# Patient Record
Sex: FEMALE | Race: BLACK OR AFRICAN AMERICAN | NOT HISPANIC OR LATINO | Employment: FULL TIME | URBAN - METROPOLITAN AREA
[De-identification: names, ages, dates, MRNs, and addresses within clinical notes are randomized per-mention and may not be internally consistent; named-entity substitution may affect disease eponyms.]

---

## 2023-08-21 ENCOUNTER — LAB VISIT (OUTPATIENT)
Dept: LAB | Facility: HOSPITAL | Age: 18
End: 2023-08-21
Attending: STUDENT IN AN ORGANIZED HEALTH CARE EDUCATION/TRAINING PROGRAM
Payer: MEDICAID

## 2023-08-21 ENCOUNTER — HOSPITAL ENCOUNTER (OUTPATIENT)
Dept: RADIOLOGY | Facility: HOSPITAL | Age: 18
Discharge: HOME | End: 2023-08-21
Attending: STUDENT IN AN ORGANIZED HEALTH CARE EDUCATION/TRAINING PROGRAM
Payer: MEDICAID

## 2023-08-21 ENCOUNTER — OFFICE VISIT (OUTPATIENT)
Dept: OBSTETRICS AND GYNECOLOGY | Facility: CLINIC | Age: 18
End: 2023-08-21
Payer: MEDICAID

## 2023-08-21 VITALS
BODY MASS INDEX: 42.83 KG/M2 | HEART RATE: 92 BPM | HEIGHT: 65 IN | WEIGHT: 257.06 LBS | SYSTOLIC BLOOD PRESSURE: 102 MMHG | DIASTOLIC BLOOD PRESSURE: 75 MMHG

## 2023-08-21 DIAGNOSIS — N92.6 IRREGULAR PERIODS: Primary | ICD-10-CM

## 2023-08-21 DIAGNOSIS — N92.6 IRREGULAR PERIODS: ICD-10-CM

## 2023-08-21 LAB
DHEA-S SERPL-MCNC: 304.5 UG/DL (ref 134.2–407.4)
ESTIMATED AVG GLUCOSE: 111 MG/DL (ref 68–131)
HBA1C MFR BLD: 5.5 % (ref 4–5.6)
PROLACTIN SERPL IA-MCNC: 13 NG/ML (ref 5.2–26.5)
TESTOST SERPL-MCNC: 57 NG/DL (ref 5–73)
TSH SERPL DL<=0.005 MIU/L-ACNC: 1.94 UIU/ML (ref 0.4–4)

## 2023-08-21 PROCEDURE — 3074F SYST BP LT 130 MM HG: CPT | Mod: CPTII,,, | Performed by: STUDENT IN AN ORGANIZED HEALTH CARE EDUCATION/TRAINING PROGRAM

## 2023-08-21 PROCEDURE — 84270 ASSAY OF SEX HORMONE GLOBUL: CPT | Performed by: STUDENT IN AN ORGANIZED HEALTH CARE EDUCATION/TRAINING PROGRAM

## 2023-08-21 PROCEDURE — 3008F PR BODY MASS INDEX (BMI) DOCUMENTED: ICD-10-PCS | Mod: CPTII,,, | Performed by: STUDENT IN AN ORGANIZED HEALTH CARE EDUCATION/TRAINING PROGRAM

## 2023-08-21 PROCEDURE — 99204 OFFICE O/P NEW MOD 45 MIN: CPT | Mod: S$PBB,,, | Performed by: STUDENT IN AN ORGANIZED HEALTH CARE EDUCATION/TRAINING PROGRAM

## 2023-08-21 PROCEDURE — 82627 DEHYDROEPIANDROSTERONE: CPT | Performed by: STUDENT IN AN ORGANIZED HEALTH CARE EDUCATION/TRAINING PROGRAM

## 2023-08-21 PROCEDURE — 3008F BODY MASS INDEX DOCD: CPT | Mod: CPTII,,, | Performed by: STUDENT IN AN ORGANIZED HEALTH CARE EDUCATION/TRAINING PROGRAM

## 2023-08-21 PROCEDURE — 84403 ASSAY OF TOTAL TESTOSTERONE: CPT | Performed by: STUDENT IN AN ORGANIZED HEALTH CARE EDUCATION/TRAINING PROGRAM

## 2023-08-21 PROCEDURE — 99204 PR OFFICE/OUTPT VISIT, NEW, LEVL IV, 45-59 MIN: ICD-10-PCS | Mod: S$PBB,,, | Performed by: STUDENT IN AN ORGANIZED HEALTH CARE EDUCATION/TRAINING PROGRAM

## 2023-08-21 PROCEDURE — 99999 PR PBB SHADOW E&M-NEW PATIENT-LVL III: ICD-10-PCS | Mod: PBBFAC,,, | Performed by: STUDENT IN AN ORGANIZED HEALTH CARE EDUCATION/TRAINING PROGRAM

## 2023-08-21 PROCEDURE — 1159F PR MEDICATION LIST DOCUMENTED IN MEDICAL RECORD: ICD-10-PCS | Mod: CPTII,,, | Performed by: STUDENT IN AN ORGANIZED HEALTH CARE EDUCATION/TRAINING PROGRAM

## 2023-08-21 PROCEDURE — 99999 PR PBB SHADOW E&M-NEW PATIENT-LVL III: CPT | Mod: PBBFAC,,, | Performed by: STUDENT IN AN ORGANIZED HEALTH CARE EDUCATION/TRAINING PROGRAM

## 2023-08-21 PROCEDURE — 76856 US PELVIS COMP WITH TRANSVAG NON-OB (XPD): ICD-10-PCS | Mod: 26,,, | Performed by: RADIOLOGY

## 2023-08-21 PROCEDURE — 76830 TRANSVAGINAL US NON-OB: CPT | Mod: 26,,, | Performed by: RADIOLOGY

## 2023-08-21 PROCEDURE — 1159F MED LIST DOCD IN RCRD: CPT | Mod: CPTII,,, | Performed by: STUDENT IN AN ORGANIZED HEALTH CARE EDUCATION/TRAINING PROGRAM

## 2023-08-21 PROCEDURE — 87591 N.GONORRHOEAE DNA AMP PROB: CPT | Performed by: STUDENT IN AN ORGANIZED HEALTH CARE EDUCATION/TRAINING PROGRAM

## 2023-08-21 PROCEDURE — 3078F DIAST BP <80 MM HG: CPT | Mod: CPTII,,, | Performed by: STUDENT IN AN ORGANIZED HEALTH CARE EDUCATION/TRAINING PROGRAM

## 2023-08-21 PROCEDURE — 83036 HEMOGLOBIN GLYCOSYLATED A1C: CPT | Performed by: STUDENT IN AN ORGANIZED HEALTH CARE EDUCATION/TRAINING PROGRAM

## 2023-08-21 PROCEDURE — 99203 OFFICE O/P NEW LOW 30 MIN: CPT | Mod: PBBFAC,PN | Performed by: STUDENT IN AN ORGANIZED HEALTH CARE EDUCATION/TRAINING PROGRAM

## 2023-08-21 PROCEDURE — 84146 ASSAY OF PROLACTIN: CPT | Performed by: STUDENT IN AN ORGANIZED HEALTH CARE EDUCATION/TRAINING PROGRAM

## 2023-08-21 PROCEDURE — 76830 US PELVIS COMP WITH TRANSVAG NON-OB (XPD): ICD-10-PCS | Mod: 26,,, | Performed by: RADIOLOGY

## 2023-08-21 PROCEDURE — 36415 COLL VENOUS BLD VENIPUNCTURE: CPT | Mod: PN | Performed by: STUDENT IN AN ORGANIZED HEALTH CARE EDUCATION/TRAINING PROGRAM

## 2023-08-21 PROCEDURE — 76856 US EXAM PELVIC COMPLETE: CPT | Mod: 26,,, | Performed by: RADIOLOGY

## 2023-08-21 PROCEDURE — 3078F PR MOST RECENT DIASTOLIC BLOOD PRESSURE < 80 MM HG: ICD-10-PCS | Mod: CPTII,,, | Performed by: STUDENT IN AN ORGANIZED HEALTH CARE EDUCATION/TRAINING PROGRAM

## 2023-08-21 PROCEDURE — 83520 IMMUNOASSAY QUANT NOS NONAB: CPT | Performed by: STUDENT IN AN ORGANIZED HEALTH CARE EDUCATION/TRAINING PROGRAM

## 2023-08-21 PROCEDURE — 83498 ASY HYDROXYPROGESTERONE 17-D: CPT | Performed by: STUDENT IN AN ORGANIZED HEALTH CARE EDUCATION/TRAINING PROGRAM

## 2023-08-21 PROCEDURE — 3074F PR MOST RECENT SYSTOLIC BLOOD PRESSURE < 130 MM HG: ICD-10-PCS | Mod: CPTII,,, | Performed by: STUDENT IN AN ORGANIZED HEALTH CARE EDUCATION/TRAINING PROGRAM

## 2023-08-21 PROCEDURE — 84443 ASSAY THYROID STIM HORMONE: CPT | Performed by: STUDENT IN AN ORGANIZED HEALTH CARE EDUCATION/TRAINING PROGRAM

## 2023-08-21 PROCEDURE — 76856 US EXAM PELVIC COMPLETE: CPT | Mod: TC,PO

## 2023-08-21 NOTE — PROGRESS NOTES
History & Physical  Gynecology      SUBJECTIVE:     Chief Complaint: Establish Care       History of Present Illness:    Here today for irregular periods. Menarche at 7. Always had irregular periods, usually twice a month, will sometimes skip a month. + facial hair but women in her family do as well. No nipple hair or acne. Sexually active, using condoms. + weight gain. + family hx of type 2 DM.    Review of patient's allergies indicates:  No Known Allergies    History reviewed. No pertinent past medical history.  Past Surgical History:   Procedure Laterality Date    TONSILLECTOMY       OB History          0    Para   0    Term   0       0    AB   0    Living   0         SAB   0    IAB   0    Ectopic   0    Multiple   0    Live Births   0               Family History   Problem Relation Age of Onset    Breast cancer Maternal Grandmother     Cervical cancer Maternal Aunt     Colon cancer Neg Hx     Ovarian cancer Neg Hx      Social History     Tobacco Use    Smoking status: Never    Smokeless tobacco: Never   Substance Use Topics    Alcohol use: Never    Drug use: Never       Current Outpatient Medications   Medication Sig    norethindrone-e.estradioL-iron 1 mg-20 mcg (24)/75 mg (4) Oral per tablet Take 1 tablet by mouth once daily.     No current facility-administered medications for this visit.         Review of Systems:  Review of Systems   Constitutional:  Negative for chills, fatigue and fever.   HENT:  Negative for congestion.    Eyes:  Negative for visual disturbance.   Respiratory:  Negative for cough and shortness of breath.    Cardiovascular:  Negative for chest pain and palpitations.   Gastrointestinal:  Negative for abdominal distention, abdominal pain, constipation, diarrhea, nausea and vomiting.   Genitourinary:  Negative for difficulty urinating, dysuria, hematuria, vaginal bleeding and vaginal discharge.   Skin:  Negative for rash.   Neurological:  Negative for dizziness, seizures,  light-headedness and headaches.   Hematological:  Does not bruise/bleed easily.   Psychiatric/Behavioral:  Negative for dysphoric mood. The patient is not nervous/anxious.         OBJECTIVE:     Physical Exam:  Physical Exam  Vitals reviewed.   Constitutional:       General: She is not in acute distress.     Appearance: Normal appearance. She is well-developed. She is obese.   HENT:      Head: Normocephalic and atraumatic.   Cardiovascular:      Rate and Rhythm: Normal rate and regular rhythm.   Pulmonary:      Effort: Pulmonary effort is normal.   Abdominal:      General: There is no distension.      Palpations: Abdomen is soft.   Genitourinary:     Vagina: Normal.   Skin:     General: Skin is warm.   Neurological:      Mental Status: She is alert and oriented to person, place, and time.   Psychiatric:         Behavior: Behavior normal.         Thought Content: Thought content normal.         Judgment: Judgment normal.           ASSESSMENT:       ICD-10-CM ICD-9-CM    1. Irregular periods  N92.6 626.4 Hemoglobin A1C      TSH      SEX HORMONE BINDING GLOBULIN      17-HYDROXYPROGESTERONE      DHEA-SULFATE      TESTOSTERONE      ANTIMULLERIAN HORMONE ([facility])      PROLACTIN      US Pelvis Comp with Transvag NON-OB (xpd      C. trachomatis/N. gonorrhoeae by AMP DNA          Orders Placed This Encounter   Procedures    C. trachomatis/N. gonorrhoeae by AMP DNA     Order Specific Question:   Source:     Answer:   Urine    US Pelvis Comp with Transvag NON-OB (xpd     Standing Status:   Future     Standing Expiration Date:   8/21/2024     Order Specific Question:   May the Radiologist modify the order per protocol to meet the clinical needs of the patient?     Answer:   Yes     Order Specific Question:   Release to patient     Answer:   Immediate    Hemoglobin A1C     Standing Status:   Future     Standing Expiration Date:   10/19/2024    TSH     Standing Status:   Future     Standing Expiration Date:   8/20/2024    SEX  HORMONE BINDING GLOBULIN     Standing Status:   Future     Standing Expiration Date:   10/19/2024    17-HYDROXYPROGESTERONE     Standing Status:   Future     Standing Expiration Date:   10/19/2024    DHEA-SULFATE     Standing Status:   Future     Standing Expiration Date:   10/19/2024    TESTOSTERONE     Standing Status:   Future     Standing Expiration Date:   10/19/2024    ANTIMULLERIAN HORMONE ([facility])     Standing Status:   Future     Standing Expiration Date:   10/19/2024    PROLACTIN     Standing Status:   Future     Standing Expiration Date:   10/19/2024           Plan:      Irregular bleeding:  - discussed differential dx including infection, anovulatory bleeding, immature HPO axis, endocrine disorders and PCOS  - - Discussed polycystic ovarian syndrome (PCOS) is a disorder characterized by hyperandrogenism, ovulatory dysfunction, and polycystic appearing ovaries. Discussed chicken and the egg sequence. Discussed increased risk for metabolic syndrome, risk of DM and cardiovascular disease. We discussed lab work to diagnose and rule out other etiologies including: testosterone, SHBG, TSH, PRL, 17OHP, GTT or hemoglobin A1c and Lipid panel. Discussed use of US to look at ovaries and possible endometrial abnormalities. We dicussed insulin resistance to be central to the etiology of the disease.   - Discussed weight loss can help lowering circulating levels of androgen and help spontaneous ovulation/resumption of menses. Even a 5% reduction in weight has been shown to help. Intermittent fasting proving to be good at lowering insulin levels. Discussed lifestyle modification including diet and exercise.   - discussed metformin if studies showing insulin resistance. Discussed certain medications can help with hirsutism if indicated for patient.   - Discussed treatment depends on end goal including regular menstrual cycles vs fertility help. Usually OCPs are a first line therapy to maintain level of shedding lining of  the uterus. Can also do cyclic progesterone as to not interfere with pregnancy. If trying to get pregnant may require OI or SIVA help.   - discussed need for regular cycles as PCOS can increase EIN or endometrial cancer.   - All questions answered    - labs ordered, tVUS ordered  - start OCPs  - if insulin resistance noted can try metformin but likely would refer to nutrition and endocrinology for other medications like ozempic.     F/u 3 months    Isabel Meléndez M.D.  Obstetrics and Gynecology

## 2023-08-22 LAB
C TRACH DNA SPEC QL NAA+PROBE: NOT DETECTED
N GONORRHOEA DNA SPEC QL NAA+PROBE: NOT DETECTED

## 2023-08-23 LAB — MIS SERPL-MCNC: 2.6 NG/ML (ref 1.2–12)

## 2023-08-24 LAB
17OHP SERPL-MCNC: 191 NG/DL (ref 35–413)
SHBG SERPL-SCNC: 34 NMOL/L

## 2023-10-17 ENCOUNTER — TELEPHONE (OUTPATIENT)
Dept: OBSTETRICS AND GYNECOLOGY | Facility: CLINIC | Age: 18
End: 2023-10-17
Payer: MEDICAID

## 2023-10-17 NOTE — TELEPHONE ENCOUNTER
Patient had question in regard to birth control. Advised patient to continue taking pills. Patient verbalized understanding.    ----- Message from Priscila Allen MA sent at 10/17/2023 11:37 AM CDT -----  Type:  Patient Returning Call    Who Called:  pt  Who Left Message for Patient:  na  Does the patient know what this is regarding?:  birthcontrol  Best Call Back Number:  [phone]    Additional Information:  please advise       [Initial Evaluation] : an initial evaluation [Sleep Apnea] : sleep apnea

## 2024-02-26 ENCOUNTER — TELEPHONE (OUTPATIENT)
Dept: OBSTETRICS AND GYNECOLOGY | Facility: CLINIC | Age: 19
End: 2024-02-26
Payer: MEDICAID

## 2024-02-26 NOTE — TELEPHONE ENCOUNTER
Patient was confused because pharmacy gave her different rx. Informed patient that sometimes pharmacy gives the birth control they have in stock but it will be the same dosage. Just different name. Patient verbalized understanding.      ----- Message from Karlene Summers LPN sent at 2/23/2024  3:00 PM CST -----  Regarding: FW: refill bc  Contact: patient    ----- Message -----  From: Ragini Melendez  Sent: 2/23/2024   1:11 PM CST  To: Rika DIAZ Staff  Subject: refill bc                                        Type: Needs Medical Advice  Who Called:  patient   Symptoms (please be specific):    norethindrone-e.estradioL-iron 1 mg-20 mcg (24)/75 mg (4) Oral per tablet    How long has patient had these symptoms:    Pharmacy name and phone #:    [id number] - [place] [address] [facility] [address]  [address] [address]  Phone: [phone] Fax: [phone]      Best Call Back Number: [phone]    Additional Information: needs the following refilled

## 2024-09-09 ENCOUNTER — TELEPHONE (OUTPATIENT)
Dept: OBSTETRICS AND GYNECOLOGY | Facility: CLINIC | Age: 19
End: 2024-09-09
Payer: MEDICAID

## 2024-09-13 ENCOUNTER — OFFICE VISIT (OUTPATIENT)
Dept: OBSTETRICS AND GYNECOLOGY | Facility: CLINIC | Age: 19
End: 2024-09-13
Payer: MEDICAID

## 2024-09-13 VITALS
BODY MASS INDEX: 39.22 KG/M2 | WEIGHT: 235.69 LBS | DIASTOLIC BLOOD PRESSURE: 64 MMHG | SYSTOLIC BLOOD PRESSURE: 108 MMHG

## 2024-09-13 DIAGNOSIS — Z12.4 CERVICAL CANCER SCREENING: ICD-10-CM

## 2024-09-13 DIAGNOSIS — Z01.419 WELL WOMAN EXAM WITH ROUTINE GYNECOLOGICAL EXAM: Primary | ICD-10-CM

## 2024-09-13 DIAGNOSIS — Z30.9 ENCOUNTER FOR CONTRACEPTIVE MANAGEMENT, UNSPECIFIED TYPE: ICD-10-CM

## 2024-09-13 PROCEDURE — 99999 PR PBB SHADOW E&M-EST. PATIENT-LVL III: CPT | Mod: PBBFAC,,,

## 2024-09-13 PROCEDURE — 99213 OFFICE O/P EST LOW 20 MIN: CPT | Mod: PBBFAC,PN

## 2024-09-13 NOTE — PROGRESS NOTES
HISTORY OF PRESENT ILLNESS:    Tati Ralph is a 19 y.o. female, , Patient's last menstrual period was 2024 (exact date).,  presents for a routine annual exam .   Last pap:  ***   Gardisil?: {YES/NO:}  Last mammogram: *** NL TC ***%  Last colon ca screening:  ***   SA: {MALE/FEMALE:97256} partner, {does/does not:} use condoms  Contraception: {contraceptive method:5051}.    Sexually transmitted infection risk: {std risk:}.   Menstrual flow: {menses:715}.  This is the extent of the patient's complaints at this time.     History reviewed. No pertinent past medical history.    Past Surgical History:   Procedure Laterality Date    TONSILLECTOMY         MEDICATIONS AND ALLERGIES:    No current outpatient medications on file.    Review of patient's allergies indicates:  No Known Allergies    Family History   Problem Relation Name Age of Onset    Breast cancer Maternal Grandmother      Cervical cancer Maternal Aunt      Colon cancer Neg Hx      Ovarian cancer Neg Hx         Social History     Socioeconomic History    Marital status: Single   Tobacco Use    Smoking status: Never    Smokeless tobacco: Never   Substance and Sexual Activity    Alcohol use: Never    Drug use: Never    Sexual activity: Yes     Partners: Male     Birth control/protection: Condom     Social Determinants of Health     Transportation Needs: No Transportation Needs (2023)    [place] [facility] [employer] - [facility]     Lack of Transportation (Medical): No     Lack of Transportation (Non-Medical): No       OB History    Para Term  AB Living   0 0 0 0 0 0   SAB IAB Ectopic Multiple Live Births   0 0 0 0 0          COMPREHENSIVE GYN HISTORY:  PAP History: Denies abnormal Paps.  Infection History: Denies STDs. Denies PID.  Benign History: Denies uterine fibroids. Denies ovarian cysts. Denies endometriosis. Denies other conditions.  Cancer History: Denies cervical cancer. Denies  uterine cancer or hyperplasia. Denies ovarian cancer. Denies vulvar cancer or pre-cancer. Denies vaginal cancer or pre-cancer. Denies breast cancer. Denies colon cancer.      ROS:  GENERAL: No weight changes. No swelling. No fatigue. No fever.  BREASTS: No pain. No lumps. No discharge.  ABDOMEN: No pain. No nausea. No vomiting. No diarrhea. No constipation.  REPRODUCTIVE: No abnormal bleeding. No pelvic pain.   VULVA: No pain. No lesions. No itching.  VAGINA: No relaxation. No itching. No odor. No discharge. No lesions.  URINARY: No incontinence. No nocturia. No frequency. No dysuria.    /64   Wt 106.9 kg (235 lb 10.8 oz)   LMP 08/11/2024 (Exact Date)   BMI 39.22 kg/m²     PE:  Physical Exam     PROCEDURES/ORDERS:  Pap      Assessment/Plan:    There are no diagnoses linked to this encounter.    COUNSELING:  The patient was counseled today on:  -A.C.S. Pap and pelvic exam guidelines, recomendations for yearly mammogram, monthly self breast exams and to follow up with her PCP for other health maintenance.    FOLLOW-UP  annually for [employer].

## 2024-09-13 NOTE — PROGRESS NOTES
HISTORY OF PRESENT ILLNESS:    Tati Ralph is a 19 y.o. female, , Patient's last menstrual period was 2024 (exact date).,  presents for a routine annual exam .   Last pap:  never, first today    Gardisil?: Yes  Last mammogram: n/a   Last colon ca screening:  n/a   SA: not currently sexually active  Contraception: none. Stopped COCP d/t being out of refills    Sexually transmitted infection risk: very low risk of STD exposure. Declines screening today   Menstrual flow: regular every 28-30 days.    Reports she was evaluated in ER last week for pelvic pain, normal imaging. Pain is improving but still mildly present. Chart review shows small hemorrhagic ovarian cyst last year. Discussed resuming COCP and she is amendable    This is the extent of the patient's complaints at this time.     History reviewed. No pertinent past medical history.    Past Surgical History:   Procedure Laterality Date    TONSILLECTOMY         MEDICATIONS AND ALLERGIES:      Current Outpatient Medications:     norethindrone-e.estradioL-iron 1 mg-20 mcg (24)/75 mg (4) Oral per tablet, Take 1 tablet by mouth once daily., Disp: 90 tablet, Rfl: 3    Review of patient's allergies indicates:  No Known Allergies    Family History   Problem Relation Name Age of Onset    Breast cancer Maternal Grandmother      Cervical cancer Maternal Aunt      Colon cancer Neg Hx      Ovarian cancer Neg Hx         Social History     Socioeconomic History    Marital status: Single   Tobacco Use    Smoking status: Never    Smokeless tobacco: Never   Substance and Sexual Activity    Alcohol use: Never    Drug use: Never    Sexual activity: Yes     Partners: Male     Birth control/protection: Condom     Social Determinants of Health     Transportation Needs: No Transportation Needs (2023)    [facility] [employer]     Lack of Transportation (Medical): No     Lack of Transportation (Non-Medical): No       OB  History    Para Term  AB Living   0 0 0 0 0 0   SAB IAB Ectopic Multiple Live Births   0 0 0 0 0          COMPREHENSIVE GYN HISTORY:  PAP History: Denies abnormal Paps.  Infection History: Denies STDs. Denies PID.  Benign History: Denies uterine fibroids. Denies ovarian cysts. Denies endometriosis. Denies other conditions.  Cancer History: Denies cervical cancer. Denies uterine cancer or hyperplasia. Denies ovarian cancer. Denies vulvar cancer or pre-cancer. Denies vaginal cancer or pre-cancer. Denies breast cancer. Denies colon cancer.      ROS:  GENERAL: No weight changes. No swelling. No fatigue. No fever.  BREASTS: No pain. No lumps. No discharge.  ABDOMEN: No pain. No nausea. No vomiting. No diarrhea. No constipation.  REPRODUCTIVE: No abnormal bleeding. No pelvic pain.   VULVA: No pain. No lesions. No itching.  VAGINA: No relaxation. No itching. No odor. No discharge. No lesions.  URINARY: No incontinence. No nocturia. No frequency. No dysuria.    /64   Wt 106.9 kg (235 lb 10.8 oz)   LMP 2024 (Exact Date)   BMI 39.22 kg/m²     PE:  Physical Exam:   Constitutional: She is oriented to person, place, and time. She appears well-developed and well-nourished. No distress.    HENT:   Head: Normocephalic.    Eyes: Pupils are equal, round, and reactive to light.      Pulmonary/Chest: Effort normal. No respiratory distress. She exhibits no mass, no tenderness, no laceration, no edema, no deformity, no swelling and no retraction. Right breast exhibits no inverted nipple, no mass, no nipple discharge, no skin change, no tenderness, no bleeding and no swelling. Left breast exhibits no inverted nipple, no mass, no nipple discharge, no skin change, no tenderness, no bleeding and no swelling.        Abdominal: Soft. She exhibits no distension. There is no abdominal tenderness.     Genitourinary:    Inguinal canal, vagina, uterus, right adnexa and left adnexa normal.      Pelvic exam was performed  with patient supine.   The external female genitalia was normal.     Labial bartholins normal.There is no rash, tenderness or lesion on the right labia. There is no rash, tenderness or lesion on the left labia. Cervix is normal. Right adnexum displays no mass, no tenderness and no fullness. Left adnexum displays no mass, no tenderness and no fullness. Vagina exhibits no lesion. No erythema, vaginal discharge, tenderness or bleeding in the vagina.    No signs of injury in the vagina.   Cervix exhibits no motion tenderness, no lesion, no discharge, no friability, no tenderness and no polyp.    pap smear completedUterus consistancy normal and Uerus contour normal            Musculoskeletal: Normal range of motion and moves all extremeties.       Neurological: She is alert and oriented to person, place, and time.    Skin: Skin is warm and dry.    Psychiatric: She has a normal mood and affect. Judgment and thought content normal.        PROCEDURES/ORDERS:  Pap      Assessment/Plan:    Well woman exam with routine gynecological exam  -     Liquid-Based Pap Smear, Screening    Encounter for contraceptive management, unspecified type  -     norethindrone-e.estradioL-iron 1 mg-20 mcg (24)/75 mg (4) Oral per tablet; Take 1 tablet by mouth once daily.  Dispense: 90 tablet; Refill: 3    Cervical cancer screening  -     Liquid-Based Pap Smear, Screening        COUNSELING:  The patient was counseled today on:  -A.C.S. Pap and pelvic exam guidelines, recomendations for yearly mammogram, monthly self breast exams and to follow up with her PCP for other health maintenance.    FOLLOW-UP  annually for [employer].

## 2024-10-29 ENCOUNTER — OFFICE VISIT (OUTPATIENT)
Dept: OBSTETRICS AND GYNECOLOGY | Facility: CLINIC | Age: 19
End: 2024-10-29
Payer: MEDICAID

## 2024-10-29 VITALS
DIASTOLIC BLOOD PRESSURE: 70 MMHG | SYSTOLIC BLOOD PRESSURE: 110 MMHG | HEIGHT: 65 IN | WEIGHT: 245.56 LBS | BODY MASS INDEX: 40.91 KG/M2

## 2024-10-29 DIAGNOSIS — R10.2 PELVIC PAIN: Primary | ICD-10-CM

## 2024-10-29 PROCEDURE — 3078F DIAST BP <80 MM HG: CPT | Mod: CPTII,,,

## 2024-10-29 PROCEDURE — 3008F BODY MASS INDEX DOCD: CPT | Mod: CPTII,,,

## 2024-10-29 PROCEDURE — 99999 PR PBB SHADOW E&M-EST. PATIENT-LVL III: CPT | Mod: PBBFAC,,,

## 2024-10-29 PROCEDURE — 1159F MED LIST DOCD IN RCRD: CPT | Mod: CPTII,,,

## 2024-10-29 PROCEDURE — 3074F SYST BP LT 130 MM HG: CPT | Mod: CPTII,,,

## 2024-10-29 PROCEDURE — 99213 OFFICE O/P EST LOW 20 MIN: CPT | Mod: PBBFAC,PN

## 2024-10-29 PROCEDURE — 99213 OFFICE O/P EST LOW 20 MIN: CPT | Mod: S$PBB,,,

## 2024-10-30 ENCOUNTER — HOSPITAL ENCOUNTER (OUTPATIENT)
Dept: RADIOLOGY | Facility: HOSPITAL | Age: 19
Discharge: HOME | End: 2024-10-30
Payer: MEDICAID

## 2024-10-30 DIAGNOSIS — R10.2 PELVIC PAIN: ICD-10-CM

## 2024-10-30 PROCEDURE — 76830 TRANSVAGINAL US NON-OB: CPT | Mod: TC,PO

## 2024-10-30 PROCEDURE — 76830 TRANSVAGINAL US NON-OB: CPT | Mod: 26,,, | Performed by: RADIOLOGY

## 2024-10-30 PROCEDURE — 76856 US EXAM PELVIC COMPLETE: CPT | Mod: 26,,, | Performed by: RADIOLOGY

## 2024-11-01 ENCOUNTER — PATIENT MESSAGE (OUTPATIENT)
Dept: OBSTETRICS AND GYNECOLOGY | Facility: CLINIC | Age: 19
End: 2024-11-01
Payer: MEDICAID

## 2024-11-01 DIAGNOSIS — N83.8 OVARIAN MASS, RIGHT: Primary | ICD-10-CM

## 2024-11-05 ENCOUNTER — LAB VISIT (OUTPATIENT)
Dept: LAB | Facility: HOSPITAL | Age: 19
End: 2024-11-05
Payer: MEDICAID

## 2024-11-05 DIAGNOSIS — N83.8 OVARIAN MASS, RIGHT: ICD-10-CM

## 2024-11-05 LAB
CANCER AG125 SERPL-ACNC: 10 U/ML (ref 0–30)
CEA SERPL-MCNC: 1.7 NG/ML (ref 0–5)

## 2024-11-05 PROCEDURE — 86305 HUMAN EPIDIDYMIS PROTEIN 4: CPT

## 2024-11-05 PROCEDURE — 36415 COLL VENOUS BLD VENIPUNCTURE: CPT | Mod: PO

## 2024-11-05 PROCEDURE — 82378 CARCINOEMBRYONIC ANTIGEN: CPT

## 2024-11-05 PROCEDURE — 86304 IMMUNOASSAY TUMOR CA 125: CPT

## 2024-11-06 ENCOUNTER — TELEPHONE (OUTPATIENT)
Dept: OBSTETRICS AND GYNECOLOGY | Facility: CLINIC | Age: 19
End: 2024-11-06
Payer: MEDICAID

## 2024-11-06 NOTE — TELEPHONE ENCOUNTER
----- Message from Milo sent at 11/6/2024  2:52 PM CST -----  Type: Needs Medical Advice    Who Called:  Pt    Best Call Back Number: [phone]    Additional Information: Pt wants a call back in regards to us results. Please call back to advise, Thanks!

## 2024-11-06 NOTE — TELEPHONE ENCOUNTER
Informed pt message was forwarded to provider, once the provider reviews the message she will send a message back  Pt verb understanding

## 2024-11-08 LAB
CANCER AG125 SERPL IA-ACNC: 7 U/ML
HE4 SERPL-SCNC: 38 PMOL/L
ROMA SCORE POSTMEN SERPL: 0.53
ROMA SCORE PREMEN SERPL: 0.38

## 2025-01-30 ENCOUNTER — OFFICE VISIT (OUTPATIENT)
Dept: OBSTETRICS AND GYNECOLOGY | Facility: CLINIC | Age: 20
End: 2025-01-30
Payer: MEDICAID

## 2025-01-30 VITALS
DIASTOLIC BLOOD PRESSURE: 70 MMHG | BODY MASS INDEX: 39.75 KG/M2 | SYSTOLIC BLOOD PRESSURE: 100 MMHG | HEIGHT: 65 IN | WEIGHT: 238.56 LBS

## 2025-01-30 DIAGNOSIS — R10.2 PELVIC PAIN: Primary | ICD-10-CM

## 2025-01-30 DIAGNOSIS — N83.201 RIGHT OVARIAN CYST: ICD-10-CM

## 2025-01-30 DIAGNOSIS — Z30.9 ENCOUNTER FOR CONTRACEPTIVE MANAGEMENT, UNSPECIFIED TYPE: ICD-10-CM

## 2025-01-30 PROCEDURE — 3008F BODY MASS INDEX DOCD: CPT | Mod: CPTII,,, | Performed by: STUDENT IN AN ORGANIZED HEALTH CARE EDUCATION/TRAINING PROGRAM

## 2025-01-30 PROCEDURE — 1159F MED LIST DOCD IN RCRD: CPT | Mod: CPTII,,, | Performed by: STUDENT IN AN ORGANIZED HEALTH CARE EDUCATION/TRAINING PROGRAM

## 2025-01-30 PROCEDURE — 99999 PR PBB SHADOW E&M-EST. PATIENT-LVL III: CPT | Mod: PBBFAC,,, | Performed by: STUDENT IN AN ORGANIZED HEALTH CARE EDUCATION/TRAINING PROGRAM

## 2025-01-30 PROCEDURE — 3074F SYST BP LT 130 MM HG: CPT | Mod: CPTII,,, | Performed by: STUDENT IN AN ORGANIZED HEALTH CARE EDUCATION/TRAINING PROGRAM

## 2025-01-30 PROCEDURE — 99214 OFFICE O/P EST MOD 30 MIN: CPT | Mod: S$PBB,,, | Performed by: STUDENT IN AN ORGANIZED HEALTH CARE EDUCATION/TRAINING PROGRAM

## 2025-01-30 PROCEDURE — 99213 OFFICE O/P EST LOW 20 MIN: CPT | Mod: PBBFAC,PN | Performed by: STUDENT IN AN ORGANIZED HEALTH CARE EDUCATION/TRAINING PROGRAM

## 2025-01-30 PROCEDURE — 3078F DIAST BP <80 MM HG: CPT | Mod: CPTII,,, | Performed by: STUDENT IN AN ORGANIZED HEALTH CARE EDUCATION/TRAINING PROGRAM

## 2025-01-30 NOTE — PROGRESS NOTES
History & Physical  Gynecology      SUBJECTIVE:     Chief Complaint: Discuss bloodwork       History of Present Illness:    Here today for f/u of blood work. Having some right pelvic pain in October.US showed 2cm cyst. Pain is ok today. Has not been faithful with medication    TVUS 2024  FINDINGS:  Uterus:Uterus measures 7.7 x 3.1 x 4.2 cm.  Endometrial stripe measures 9 mm.     Right ovary: Right ovary measures 3.3 cm.  Right ovarian mass measures 2.5 x 1.4 x 2.1 cm, this lesion i has areas of internal hypoechogenicity but otherwise s isoechoic to adjacent ovary.  This lesion demonstrates internal blood flow.  This lesion is nonshadowing.     Left ovary: Left ovary measures 2.8 x 1.9 x 3.2 cm.  Normal appearing follicles are present.     No significant free fluid is seen in the pelvis.     Impression:     Right ovarian mass, O-Rads 4.  Intermediate risk.     TVUS 2023  EXAM: US PELVIS COMP WITH TRANSVAG NON-OB (XPD)     CLINICAL HISTORY: Irregular menses.     TECHNIQUE: Complete transabdominal and transvaginal pelvic ultrasound was performed using color and spectral Doppler.     FINDINGS: The uterus is normal in size and appearance without focal mass seen.  The uterus measures 6.7 x 3.1 x 3.6 cm. Endometrial thickness is normal measuring 10 mm. The right ovary measures 4.0 x 3.2 x 2.8 cm. The left ovary measures 2.9 x 1.9 x 1.7 cm.  Within the right ovary is a cystic focus with a small amount of internal debris/hemorrhage measuring 1.3 x 1.5 x 1.2 cm.  There is adjacent more solid-appearing structure within the right ovary measuring 1.4 x 1.7 x 1.3 cm.  It is slightly hyperechoic.  Color and spectral Doppler evaluation demonstrates normal flow to both ovaries without evidence of torsion.  Small free fluid adjacent to the left adnexal region.        Impression:     1. Partially cystic lesion within the right ovary containing a small amount of debris or hemorrhage could relate to a partially hemorrhagic cyst.  There  may be an adjacent ovarian dermoid or hemorrhagic cyst.  These likely relate to O-RADS category 2 although Short-term 8-12 weeks follow-up is recommended.  2.  Normal endometrial thickness for age with a normal appearance.    Review of patient's allergies indicates:  No Known Allergies    History reviewed. No pertinent past medical history.  Past Surgical History:   Procedure Laterality Date    TONSILLECTOMY       OB History          0    Para   0    Term   0       0    AB   0    Living   0         SAB   0    IAB   0    Ectopic   0    Multiple   0    Live Births   0               Family History   Problem Relation Name Age of Onset    Breast cancer Maternal Grandmother      Cervical cancer Maternal Aunt      Colon cancer Neg Hx      Ovarian cancer Neg Hx       Social History     Tobacco Use    Smoking status: Never    Smokeless tobacco: Never   Substance Use Topics    Alcohol use: Never    Drug use: Never       Current Outpatient Medications   Medication Sig    norethindrone-e.estradioL-iron 1 mg-20 mcg (24)/75 mg (4) Oral per tablet Take 1 tablet by mouth once daily.     No current facility-administered medications for this visit.         Review of Systems:  Review of Systems   Constitutional:  Negative for chills, fatigue and fever.   HENT:  Negative for congestion.    Eyes:  Negative for visual disturbance.   Respiratory:  Negative for cough and shortness of breath.    Cardiovascular:  Negative for chest pain and palpitations.   Gastrointestinal:  Negative for abdominal distention, abdominal pain, constipation, diarrhea, nausea and vomiting.   Genitourinary:  Negative for difficulty urinating, dysuria, hematuria, vaginal bleeding and vaginal discharge.   Skin:  Negative for rash.   Neurological:  Negative for dizziness, seizures, light-headedness and headaches.   Hematological:  Does not bruise/bleed easily.   Psychiatric/Behavioral:  Negative for dysphoric mood. The patient is not nervous/anxious.          OBJECTIVE:     Physical Exam:  Physical Exam  Vitals reviewed.   Constitutional:       General: She is not in acute distress.     Appearance: Normal appearance. She is well-developed.   HENT:      Head: Normocephalic and atraumatic.   Cardiovascular:      Rate and Rhythm: Normal rate and regular rhythm.   Pulmonary:      Effort: Pulmonary effort is normal.   Abdominal:      General: There is no distension.      Palpations: Abdomen is soft.   Genitourinary:     Vagina: Normal.   Skin:     General: Skin is warm.   Neurological:      Mental Status: She is alert and oriented to person, place, and time.   Psychiatric:         Behavior: Behavior normal.         Thought Content: Thought content normal.         Judgment: Judgment normal.           ASSESSMENT:       ICD-10-CM ICD-9-CM    1. Pelvic pain  R10.2 [id number]       2. Encounter for contraceptive management, unspecified type  Z30.9 V25.9 norethindrone-e.estradioL-iron 1 mg-20 mcg (24)/75 mg (4) Oral per tablet      3. Right ovarian cyst  N83.201 620.2           No orders of the defined types were placed in this encounter.          Plan:      - reviewed imaging and past notes  - compared previous US to most recent US  - labs normal no indication of malignancy  - discussed likely small dermoid cyst. Recommend continuing to monitor. Discussed surgical intervention if pain worsens vs if starts growing rapidly in size  - will continue OCPs  - f/u in 6 months    Isabel Meléndez M.D.  Obstetrics and Gynecology